# Patient Record
Sex: MALE | Employment: UNEMPLOYED | URBAN - METROPOLITAN AREA
[De-identification: names, ages, dates, MRNs, and addresses within clinical notes are randomized per-mention and may not be internally consistent; named-entity substitution may affect disease eponyms.]

---

## 2020-07-28 ENCOUNTER — HOSPITAL ENCOUNTER (OUTPATIENT)
Dept: DIABETES SERVICES | Age: 65
Setting detail: THERAPIES SERIES
Discharge: HOME | End: 2020-07-28

## 2020-07-28 VITALS — TEMPERATURE: 97.7 F

## 2020-07-28 PROCEDURE — G0109 DIAB MANAGE TRN IND/GROUP: HCPCS

## 2020-07-28 RX ORDER — METFORMIN HYDROCHLORIDE 500 MG/1
500 TABLET, EXTENDED RELEASE ORAL
COMMUNITY

## 2020-07-28 SDOH — ECONOMIC STABILITY: FOOD INSECURITY: ADDITIONAL INFORMATION: NO

## 2020-07-28 ASSESSMENT — PATIENT HEALTH QUESTIONNAIRE - PHQ9
SUM OF ALL RESPONSES TO PHQ9 QUESTIONS 1 & 2: 6
SUM OF ALL RESPONSES TO PHQ QUESTIONS 1-9: 22
1. LITTLE INTEREST OR PLEASURE IN DOING THINGS: 3
SUM OF ALL RESPONSES TO PHQ QUESTIONS 1-9: 22
2. FEELING DOWN, DEPRESSED OR HOPELESS: 3

## 2020-07-28 NOTE — LETTER
[facility])- Diabetes Education    2020       Re:     Gabino Antoine         :  1955  Dear Dr. Lalita Gutierrez: Thank you for referring your patient, Gabino Antoine, for diabetes education. Your patient has completed his personalized initial comprehensive education plan. Your patient attended class on 20 that addressed the following topics:    Nursing/Medical [x]      Nutrition [x]  [x] Diabetes disease process & treatment   [x] Diabetes medication use and safety   [x] Self-monitoring blood glucose/interpreting results  [x] Prevention, detection and treatment of acute complications  [x] Prevention, detection and treatment of chronic complications  [x] Developing strategies to address psychosocial issues    [x] Nutritional management: basic principles   [x] Carbohydrate counting, plate method, label reading  [x] Benefits of/ways to incorporate physical activity  [x] Problem solving and preparing for crisis situations  [x] Diabetes supply management  [x] Goal setting and behavior modification       In addition, these other services were provided:    [x] Diet instruction on carbohydrate consistent meal plan with 3-4 cho choices per each meal and using diabetic plate method for guidance      PHQ-9 Depression Screen Score:  22  0-4: Minimal Depression                5-9: Mild Depression    10-14: Moderate Depression  15-19: Moderately Severe Depression  20-27: Severe Depression     COMMENTS:      PATIENT SELECTED GOAL:   [x]  I will eat three meals per day and measure my carbohydrate foods. []  I will increase my activity to:  []  I will check my blood glucose as ordered by my doctor. []  I will take my medications at the correct times as ordered by my doctor.   []  Other:      DIABETES SELF-MANAGEMENT SUPPORT PLAN/REFERRALS (patient identified):  [x] Keep my scheduled visits with my doctor   [x] Make and keep appointments with specialists (foot, eye, dentist) as recommended [] Consult my pharmacist with all new medications and/or any medication questions  [] Get tested for sleep apnea  [] Seek help for:   [] Make an appointment with:   [] Attend smoking cessation classes or call help-line (7-363-QUIT-NOW; [phone])  [] Attend a diabetes support group  [x] Use diabetes magazines, books, or the American Diabetes Association website (www.diabetes. org) for more information    [] Start or increase exercising at home or join a program:  [] Other: There will be a follow-up in 3 months to evaluate the attainment of their chosen goal, and self identified support plan and you will be notified of their progress. Thank you for referring this patient to our program.  Please do not hesitate to call if you have any questions at [phone] [facility] or [facility]) or [phone] ([facility]).         Sincerely,    Diabetes Educators:     []  JERSON Schafer      [x]  Derek Galeana RN BSN [facility]     []  KALEIGH Finch [facility]         [x]  Mell Casey, MS KALEIGH LD   []  [place] NIELSEN  []  KALEIGH Marie [facility]        Diabetes

## 2020-07-28 NOTE — PROGRESS NOTES
demonstration     Supporting Education Materials/Equipment Provided: Self-management manual and Nutritional Packet   []Tuvaluan materials       [] services     []Other:      Encounter Type Date Attended Start Time End Time Comments No Show Dates   Assessment 7/28/20 877 074       Session 1 7/28/20 KMS 0915 1050      Session 2 7/28/20 [address] 0 1200     Session 3         In person Follow-up         Gestational Diabetes         Individual MNT        Meter Instrx        Insulin Instrx           Additional Comments:    Date:           DSMES Follow-up plan based on patients DSMES goal    Dr Notified by [] EMR []Fax        []HgbA1C   []Weight   []Hypertension  []Follow-up with Physician  []Medication compliance   []Plate method/meal plan compliance   []Self-Foot Exam Frequency   []Monitoring Frequency   []Exercise Routine   []Goal Attainment       []Patient lost to follow-up  Dr Notified by []EMR []Fax     Personal Support Plan:      [] Keep all scheduled doctor appointments   [] Make and keep appointments with specialists (foot, eye, dentist) as recommended   [] Consult my pharmacist about all new medications or to ask any medication questions   [] Get tested for sleep apnea   [] Seek help for:   [] Make an appointment with:   [] Attend smoking cessation classes or call 7-800-QUIT-NOW  [] Attend Diabetes Support Group   [] Use diabetes magazines, books, or credible web-sites like the ADA for more information  [] Increase exercise at home or join an exercise program:   [] Other:

## 2020-07-28 NOTE — PROGRESS NOTES
Diabetes Self-Management Education Record    Participant Name: Maggie Breen  Referring Provider: No primary care provider on file. Assessment/Evaluation Ratings:  1=Needs Instruction   4=Demonstrates Understanding/Competency  2=Needs Review   NC=Not Covered    3=Comprehends Key Points  N/A=Not Applicable  Topics/Learning Objectives Pre-session Assess Date:  7/28/20 [address] [place] [address]. Date Follow-up Date Post- session Eval Comments   Diabetes disease process & Treatment process:   -Define diabetes & prediabetes and ABCs of     diabetes   -Identify own type of diabetes  -Identify lifestyle changes/treatment options 2    Type 2 x 10 years   Developing strategies for Healthy coping/psychosocial issues:    -Describe feelings about living with diabetes  -Identify coping strategies;   -Identify support needed & support network available 1              7/28/20 PHQ-9 Score:   []Physician notified of suicidal ideations   Prevention, detection & treatment of Chronic complications:    -Identify the prevention, detection and treatment for complications including immunizations, preventive eye, foot, dental and renal exams as indicated per the participant's duration of diabetes and health status.  -Define the natural course of diabetes and the relationship of blood glucose levels to long term complications of diabetes.   1       Prevention, detection & treatment of acute complications:    -List symptoms of hyper & hypoglycemia, and prevention & treatment strategies.   -Describe sick day guidelines  DKA /indications for ketone testing &  when to call physician  1       Identify severe weather/situation crisis  & diabetes supplies management 1       Using medications safely:   -State effects of diabetes medicines on blood glucose levels;  -List diabetes medication taken, action & side effects 1    7/28/20 Metformin 500 XR and not sure of other medication, states possibly glucotrol   Insulin/Injectables  -Name appropriate injection sites; proper storage; supplies needed;  NA        Demonstrate proper technique NA       Monitoring blood glucose, interpreting and using results:   -Identify recommended & personal blood glucose targets & HgbA1C target levels  -State the Importance of logging blood glucose levels for pattern recognition;   -State benefits of reading/using pt generated health data  -Verbalize safe lancet disposal 2       -Demonstrate proper testing technique NA       Incorporating physical activity into lifestyle:   -State effect of exercise on blood glucose levels;   -State benefits of regular exercise;   -Define safety considerations;  -Describe contraindications/maintenance of activity. 1       Incorporating nutritional management into lifestyle:   -Describe effect of type, amount & timing of food on blood glucose  -Describe methods for preparing and planning healthy meals  Correctly read food labels 1       Plan personal carbohydrate-controlled meal based on individualized meal plan  Demonstrate CHO counting/portion control  1       Developing strategies for problem solving to promote health/change behavior. -Identify 7 self-care behaviors; Personal health risk factors; Benefits, challenges & strategies for behavioral change and set an individualized goal selection.  1    Goal:     Identified Barriers to learning/adherence to self management plan:    None  []  other    Instruction Method:  Lecture/Discussion, Handouts and Return demonstration     Supporting Education Materials/Equipment Provided: Self-management manual and Nutritional Packet   []Eritrean materials       [] services     []Other:      Encounter Type Date Attended Start Time End Time Comments No Show Dates   Assessment 7/28/20 166 498       Session 1         Session 2        Session 3         In person Follow-up         Gestational Diabetes         Individual MNT        Meter Instrx        Insulin Instrx           Additional Comments:    Date:           DSMES

## 2020-07-28 NOTE — PROGRESS NOTES
Diabetes Self-Management Education Record     Participant Name: Jacques Pereira  Referring Provider: No primary care provider on file. Assessment/Evaluation Ratings:  1=Needs Instruction                            4=Demonstrates Understanding/Competency  2=Needs Review                                 NC=Not Covered                      3=Comprehends Key Points               N/A=Not Applicable  Topics/Learning Objectives Pre-session Assess Date:  7/28/20 [facility]. Date Follow-up Date Post- session Eval Comments   Diabetes disease process & Treatment process:   -Define diabetes & prediabetes and ABCs of     diabetes   -Identify own type of diabetes  -Identify lifestyle changes/treatment options 2 7/28/20 KMS   4 Type 2 x 10 years, A1C 10.4%    Developing strategies for Healthy coping/psychosocial issues:    -Describe feelings about living with diabetes  -Identify coping strategies;   -Identify support needed & support network available 1 7/28/20 KMS   4                7/28/20 PHQ-9 Score: 22  [x]? Physician notified of \"thoughts of being better off dead. \"  No active plans to harm himself, just very stressed about diabetes. Prevention, detection & treatment of Chronic complications:    -Identify the prevention, detection and treatment for complications including immunizations, preventive eye, foot, dental and renal exams as indicated per the participant's duration of diabetes and health status.  -Define the natural course of diabetes and the relationship of blood glucose levels to long term complications of diabetes.   1 7/28/20 KMS   4     Prevention, detection & treatment of acute complications:    -List symptoms of hyper & hypoglycemia, and prevention & treatment strategies.   -Describe sick day guidelines  DKA /indications for ketone testing &  when to call physician  1 7/28/20 KMS   4     Identify severe weather/situation crisis  & diabetes supplies management             Using medications safely:   -State using food models and meal planning food lists. Read nutrition facts for carbohydrate and fat on various food labels. Nutritional needs for water, fiber, and recommended weight gain discussed. 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 Discussed after class with pt some options for meals/snacks on the go. Developing strategies for problem solving to promote health/change behavior. -Identify 7 self-care behaviors; Personal health risk factors; Benefits, challenges & strategies for behavioral change and set an individualized goal selection. 1 7/28/20 BHARATI   4 Goal:  Healthy eating--I will measure my carbohydrate foods and eat three times daily every 4.5 to 5 hours.       Identified Barriers to learning/adherence to self management plan:    None  []? other     Instruction Method:  Lecture/Discussion, Handouts and Return demonstration      Supporting Education Materials/Equipment Provided: Self-management manual and Nutritional Packet   []? [place] Island[place] materials       []?  services     []? Other:       Encounter Type Date Attended Start Time End Time Comments No Show Dates   Assessment 7/28/20 0900 915   in person     Session 1 7/28/20 KMS 0915 1050       Session 2 7/28/20 BHARATI 1050 1200       Session 3             In person Follow-up             Gestational Diabetes             Individual MNT             Meter Instrx             Insulin Instrx                Additional Comments:     Date:           DSMES Follow-up plan based on patients DSMES goal     Dr Notified by []? EMR []? Fax                    []?HgbA1C              []?Weight              []?Hypertension  []? Follow-up with Physician  []? Medication compliance   []? Plate method/meal plan compliance              []?Self-Foot Exam Frequency              []?Monitoring Frequency              []?Exercise Routine              []?Goal Attainment                    []?Patient lost to follow-up  Dr Notified by []? EMR []? Fax    Personal Support Plan:      [x]? Keep all scheduled doctor appointments   [x]? Make and keep appointments with specialists (foot, eye, dentist) as recommended   []? Consult my pharmacist about all new medications or to ask any medication questions   []? Get tested for sleep apnea   []? Seek help for:   []? Make an appointment with:   []? Attend smoking cessation classes or call 7-800-QUIT-NOW  []? Attend Diabetes Support Group   [x]? Use diabetes magazines, books, or credible web-sites like the ADA for more information  []? Increase exercise at home or join an exercise program:   []?  Other: